# Patient Record
Sex: FEMALE | Race: BLACK OR AFRICAN AMERICAN | URBAN - METROPOLITAN AREA
[De-identification: names, ages, dates, MRNs, and addresses within clinical notes are randomized per-mention and may not be internally consistent; named-entity substitution may affect disease eponyms.]

---

## 2022-09-13 ENCOUNTER — APPOINTMENT (OUTPATIENT)
Dept: CT IMAGING | Age: 22
End: 2022-09-13
Payer: COMMERCIAL

## 2022-09-13 ENCOUNTER — HOSPITAL ENCOUNTER (EMERGENCY)
Age: 22
Discharge: HOME | End: 2022-09-14
Payer: COMMERCIAL

## 2022-09-13 VITALS
SYSTOLIC BLOOD PRESSURE: 150 MMHG | OXYGEN SATURATION: 98 % | TEMPERATURE: 98.3 F | RESPIRATION RATE: 16 BRPM | HEART RATE: 83 BPM | WEIGHT: 245.2 LBS | DIASTOLIC BLOOD PRESSURE: 72 MMHG

## 2022-09-13 DIAGNOSIS — K42.9 UMBILICAL HERNIA WITHOUT OBSTRUCTION AND WITHOUT GANGRENE: ICD-10-CM

## 2022-09-13 DIAGNOSIS — R10.9 ACUTE LEFT FLANK PAIN: ICD-10-CM

## 2022-09-13 DIAGNOSIS — N30.00 ACUTE CYSTITIS WITHOUT HEMATURIA: Primary | ICD-10-CM

## 2022-09-13 LAB
A/G RATIO: 1.4 (ref 1.1–2.2)
ALBUMIN SERPL-MCNC: 4.3 G/DL (ref 3.4–5)
ALP BLD-CCNC: 63 U/L (ref 40–129)
ALT SERPL-CCNC: 12 U/L (ref 10–40)
ANION GAP SERPL CALCULATED.3IONS-SCNC: 9 MMOL/L (ref 3–16)
ANISOCYTOSIS: ABNORMAL
AST SERPL-CCNC: 16 U/L (ref 15–37)
BACTERIA: ABNORMAL /HPF
BASOPHILS ABSOLUTE: 0.1 K/UL (ref 0–0.2)
BASOPHILS RELATIVE PERCENT: 1.4 %
BILIRUB SERPL-MCNC: <0.2 MG/DL (ref 0–1)
BILIRUBIN URINE: NEGATIVE
BLOOD, URINE: NEGATIVE
BUN BLDV-MCNC: 14 MG/DL (ref 7–20)
CALCIUM SERPL-MCNC: 9.5 MG/DL (ref 8.3–10.6)
CHLORIDE BLD-SCNC: 108 MMOL/L (ref 99–110)
CLARITY: ABNORMAL
CO2: 23 MMOL/L (ref 21–32)
COLOR: ABNORMAL
CREAT SERPL-MCNC: 0.7 MG/DL (ref 0.6–1.1)
EOSINOPHILS ABSOLUTE: 0.2 K/UL (ref 0–0.6)
EOSINOPHILS RELATIVE PERCENT: 2.6 %
EPITHELIAL CELLS, UA: 18 /HPF (ref 0–5)
GFR AFRICAN AMERICAN: >60
GFR NON-AFRICAN AMERICAN: >60
GLUCOSE BLD-MCNC: 98 MG/DL (ref 70–99)
GLUCOSE URINE: NEGATIVE MG/DL
HCG QUALITATIVE: NEGATIVE
HCT VFR BLD CALC: 38 % (ref 36–48)
HEMOGLOBIN: 12.4 G/DL (ref 12–16)
HYALINE CASTS: 2 /LPF (ref 0–8)
HYPOCHROMIA: ABNORMAL
KETONES, URINE: ABNORMAL MG/DL
LEUKOCYTE ESTERASE, URINE: ABNORMAL
LYMPHOCYTES ABSOLUTE: 2.2 K/UL (ref 1–5.1)
LYMPHOCYTES RELATIVE PERCENT: 32.5 %
MCH RBC QN AUTO: 26.9 PG (ref 26–34)
MCHC RBC AUTO-ENTMCNC: 32.7 G/DL (ref 31–36)
MCV RBC AUTO: 82.2 FL (ref 80–100)
MICROSCOPIC EXAMINATION: YES
MONOCYTES ABSOLUTE: 0.5 K/UL (ref 0–1.3)
MONOCYTES RELATIVE PERCENT: 7.9 %
NEUTROPHILS ABSOLUTE: 3.8 K/UL (ref 1.7–7.7)
NEUTROPHILS RELATIVE PERCENT: 55.6 %
NITRITE, URINE: NEGATIVE
PDW BLD-RTO: 14.7 % (ref 12.4–15.4)
PH UA: 5.5 (ref 5–8)
PLATELET # BLD: 320 K/UL (ref 135–450)
PLATELET SLIDE REVIEW: ADEQUATE
PMV BLD AUTO: 7.7 FL (ref 5–10.5)
POTASSIUM SERPL-SCNC: 4.4 MMOL/L (ref 3.5–5.1)
PROTEIN UA: 30 MG/DL
RBC # BLD: 4.62 M/UL (ref 4–5.2)
RBC UA: 1 /HPF (ref 0–4)
SLIDE REVIEW: ABNORMAL
SODIUM BLD-SCNC: 140 MMOL/L (ref 136–145)
SPECIFIC GRAVITY UA: >=1.03 (ref 1–1.03)
TOTAL PROTEIN: 7.4 G/DL (ref 6.4–8.2)
URINE REFLEX TO CULTURE: YES
URINE TYPE: ABNORMAL
UROBILINOGEN, URINE: 1 E.U./DL
WBC # BLD: 6.8 K/UL (ref 4–11)
WBC UA: 93 /HPF (ref 0–5)

## 2022-09-13 PROCEDURE — 84703 CHORIONIC GONADOTROPIN ASSAY: CPT

## 2022-09-13 PROCEDURE — 96372 THER/PROPH/DIAG INJ SC/IM: CPT

## 2022-09-13 PROCEDURE — 81001 URINALYSIS AUTO W/SCOPE: CPT

## 2022-09-13 PROCEDURE — 87086 URINE CULTURE/COLONY COUNT: CPT

## 2022-09-13 PROCEDURE — 74176 CT ABD & PELVIS W/O CONTRAST: CPT

## 2022-09-13 PROCEDURE — 85025 COMPLETE CBC W/AUTO DIFF WBC: CPT

## 2022-09-13 PROCEDURE — 80053 COMPREHEN METABOLIC PANEL: CPT

## 2022-09-13 PROCEDURE — 99284 EMERGENCY DEPT VISIT MOD MDM: CPT

## 2022-09-13 RX ORDER — CEPHALEXIN 500 MG/1
500 CAPSULE ORAL 3 TIMES DAILY
Qty: 30 CAPSULE | Refills: 0 | Status: SHIPPED | OUTPATIENT
Start: 2022-09-13 | End: 2022-09-23

## 2022-09-13 RX ORDER — ESCITALOPRAM OXALATE 5 MG/1
5 TABLET ORAL DAILY
COMMUNITY

## 2022-09-13 RX ORDER — KETOROLAC TROMETHAMINE 30 MG/ML
30 INJECTION, SOLUTION INTRAMUSCULAR; INTRAVENOUS ONCE
Status: COMPLETED | OUTPATIENT
Start: 2022-09-14 | End: 2022-09-14

## 2022-09-13 RX ORDER — ARIPIPRAZOLE 20 MG/1
20 TABLET ORAL DAILY
COMMUNITY

## 2022-09-13 RX ORDER — CYCLOBENZAPRINE HCL 10 MG
10 TABLET ORAL 3 TIMES DAILY PRN
Qty: 20 TABLET | Refills: 0 | Status: SHIPPED | OUTPATIENT
Start: 2022-09-13 | End: 2022-09-26

## 2022-09-13 RX ORDER — NAPROXEN 500 MG/1
500 TABLET ORAL 2 TIMES DAILY
Qty: 20 TABLET | Refills: 0 | Status: SHIPPED | OUTPATIENT
Start: 2022-09-13 | End: 2022-09-23

## 2022-09-13 ASSESSMENT — ENCOUNTER SYMPTOMS
SHORTNESS OF BREATH: 0
ABDOMINAL PAIN: 0
COUGH: 0
COLOR CHANGE: 0
VOMITING: 0
WHEEZING: 0
BACK PAIN: 1
CONSTIPATION: 0
STRIDOR: 0
DIARRHEA: 0
NAUSEA: 0

## 2022-09-13 ASSESSMENT — PAIN SCALES - GENERAL: PAINLEVEL_OUTOF10: 8

## 2022-09-13 ASSESSMENT — PAIN - FUNCTIONAL ASSESSMENT: PAIN_FUNCTIONAL_ASSESSMENT: 0-10

## 2022-09-13 NOTE — LETTER
[facility] Emergency Department  [facility], [address]             September 15, 2022    Patient: Clifford Shetty   YOB: 2000   Date of Visit: 9/13/2022       To Whom It May Concern:    Clifford Shetty was seen and treated in our emergency department on 9/13/2022. She is excused from work 9/14, 9/15 and 9/16/22.         Sincerely,         Aarti De La Cruz PAC/sp

## 2022-09-14 LAB — URINE CULTURE, ROUTINE: NORMAL

## 2022-09-14 PROCEDURE — 6360000002 HC RX W HCPCS: Performed by: PHYSICIAN ASSISTANT

## 2022-09-14 RX ADMIN — KETOROLAC TROMETHAMINE 30 MG: 30 INJECTION, SOLUTION INTRAMUSCULAR at 00:04

## 2022-09-14 ASSESSMENT — PAIN SCALES - GENERAL: PAINLEVEL_OUTOF10: 10

## 2022-09-14 NOTE — ED PROVIDER NOTES
[facility]        Pt Name: Alba Zapata  MRN: [id number]  [place] 2000  Date of evaluation: 9/13/2022  Provider: Juan Serrato PA-C  PCP: Michelle Adler MD  Note Started: 9:51 PM EDT       AUBREY. I have evaluated this patient. My supervising physician was available for consultation. CHIEF COMPLAINT       Chief Complaint   Patient presents with    Back Pain     Pt states that she has been having pain for 2 weeks, pt states that she had left sided back pain without any injuries. Pt states that it hurts to move. HISTORY OF PRESENT ILLNESS   (Location, Timing/Onset, Context/Setting, Quality, Duration, Modifying Factors, Severity, Associated Signs and Symptoms)  Note limiting factors. Chief Complaint: Left low back pain    Alba Zapata is a 25 y.o. female who presents to the emergency department complaining of left low back pain for 2 weeks. It is worse with movement and palpation. It is starting to migrate out to the left flank and left low abdomen. Denies nausea, vomiting, diarrhea, constipation, rash, preceding injury or fall. She rates her pain to be an 8 out of 10 on pain scale without radiation to lower extremities, numbness, tingling, weakness, bowel or bladder incontinence or retention. Denies any acute urinary symptoms, abnormal vaginal bleeding or discharge. LMP was 8/22/2022. She has no suspicion for pregnancy or STD. Nursing Notes were all reviewed and agreed with or any disagreements were addressed in the HPI. REVIEW OF SYSTEMS    (2-9 systems for level 4, 10 or more for level 5)     Review of Systems   Constitutional:  Negative for chills and fever. HENT: Negative. Eyes:  Negative for visual disturbance. Respiratory:  Negative for cough, shortness of breath, wheezing and stridor. Cardiovascular:  Negative for chest pain, palpitations and leg swelling.    Gastrointestinal: Negative for abdominal pain, constipation, diarrhea, nausea and vomiting. Genitourinary: Negative. Musculoskeletal:  Positive for back pain. Negative for neck pain and neck stiffness. Skin:  Negative for color change, pallor, rash and wound. Neurological: Negative. Psychiatric/Behavioral:  Negative for confusion. All other systems reviewed and are negative. Positives and Pertinent negatives as per HPI. Except as noted above in the ROS, all other systems were reviewed and negative. PAST MEDICAL HISTORY   History reviewed. No pertinent past medical history. SURGICAL HISTORY   History reviewed. No pertinent surgical history. CURRENTMEDICATIONS       Previous Medications    ALBUTEROL SULFATE HFA (PROVENTIL HFA) 108 (90 BASE) MCG/ACT INHALER    Inhale 2 puffs into the lungs every 4 hours as needed for Wheezing or Shortness of Breath (Space out to every 6 hours as symptoms improve) Space out to every 6 hours as symptoms improve. ARIPIPRAZOLE (ABILIFY) 20 MG TABLET    Take 20 mg by mouth daily    ESCITALOPRAM (LEXAPRO) 5 MG TABLET    Take 5 mg by mouth daily         ALLERGIES     Risperidone and related    FAMILYHISTORY     History reviewed. No pertinent family history. SOCIAL HISTORY       Social History     Tobacco Use    Smoking status: Every Day     Types: Cigarettes, E-Cigarettes    Smokeless tobacco: Never   Substance Use Topics    Alcohol use: Yes     Comment: occ    Drug use: Never       SCREENINGS    [place]on Coma Scale  Eye Opening: Spontaneous  Best Verbal Response: Oriented  Best Motor Response: Obeys commands  Aziza Coma Scale Score: 15        PHYSICAL EXAM    (up to 7 for level 4, 8 or more for level 5)     ED Triage Vitals   BP Temp Temp src Pulse Resp SpO2 Height Weight   -- -- -- -- -- -- -- --       Physical Exam  Vitals and nursing note reviewed. Constitutional:       Appearance: Normal appearance. She is well-developed. She is obese.  She is not toxic-appearing or diaphoretic. HENT:      Head: Normocephalic and atraumatic. Right Ear: External ear normal.      Left Ear: External ear normal.      Nose: Nose normal.      Mouth/Throat:      Mouth: Mucous membranes are moist.      Pharynx: Oropharynx is clear. Eyes:      General: No scleral icterus. Right eye: No discharge. Left eye: No discharge. Extraocular Movements: Extraocular movements intact. Conjunctiva/sclera: Conjunctivae normal.      Pupils: Pupils are equal, round, and reactive to light. Cardiovascular:      Rate and Rhythm: Normal rate. Pulmonary:      Effort: Pulmonary effort is normal.      Breath sounds: Normal breath sounds. Abdominal:      General: Bowel sounds are normal. There is no distension or abdominal bruit. Palpations: Abdomen is soft. There is no pulsatile mass. Tenderness: abdominal tenderness in the left lower quadrant There is left CVA tenderness. There is no right CVA tenderness, guarding or rebound. Negative signs include Koroma's sign, Rovsing's sign, McBurney's sign, psoas sign and obturator sign. Musculoskeletal:      Cervical back: Normal and normal range of motion. Thoracic back: Normal.      Lumbar back: Spasms and tenderness (left sided musculature) present. No swelling, edema, deformity, signs of trauma, lacerations or bony tenderness. Decreased range of motion. Negative right straight leg raise test and negative left straight leg raise test. No scoliosis. Comments: No midline vertebral tenderness or step-off deformity. Negative straight leg raise. No saddle paresthesia or foot drop. Able to heel and toe walk without difficulty or deficit. 5/5 strength in all four extremities without focal weakness, paresthesia or radiculopathy     Skin:     General: Skin is warm and dry. Coloration: Skin is not jaundiced or pale. Findings: No bruising, erythema, lesion or rash.    Neurological:      General: No focal deficit present. Mental Status: She is alert and oriented to person, place, and time. Sensory: No sensory deficit. Motor: No weakness. Deep Tendon Reflexes: Reflexes normal.   Psychiatric:         Behavior: Behavior normal.       DIAGNOSTIC RESULTS   LABS:    Labs Reviewed   CBC WITH AUTO DIFFERENTIAL - Abnormal; Notable for the following components:       Result Value    Anisocytosis Occasional (*)     Hypochromia Occasional (*)     All other components within normal limits   URINALYSIS WITH REFLEX TO CULTURE - Abnormal; Notable for the following components:    Color, UA DARK YELLOW (*)     Clarity, UA CLOUDY (*)     Ketones, Urine TRACE (*)     Protein, UA 30 (*)     Leukocyte Esterase, Urine MODERATE (*)     All other components within normal limits   MICROSCOPIC URINALYSIS - Abnormal; Notable for the following components:    Bacteria, UA 4+ (*)     WBC, UA 93 (*)     Epithelial Cells, UA 18 (*)     All other components within normal limits   CULTURE, URINE   COMPREHENSIVE METABOLIC PANEL   HCG, SERUM, QUALITATIVE       When ordered only abnormal lab results are displayed. All other labs were within normal range or not returned as of this dictation. EKG: When ordered, EKG's are interpreted by the Emergency Department Physician in the absence of a cardiologist.  Please see their note for interpretation of EKG. RADIOLOGY:   Non-plain film images such as CT, Ultrasound and MRI are read by the radiologist. Plain radiographic images are visualized and preliminarily interpreted by the ED Provider with the below findings:        Interpretation per the Radiologist below, if available at the time of this note:    CT ABDOMEN PELVIS WO CONTRAST Additional Contrast? None   Final Result   No acute intra-abdominal abnormality seen. No hydronephrosis or renal stones and no CT evidence of urinary obstruction. Mild chronic liver changes with fatty replacement throughout.       Small 2 cm umbilical hernia with no bowel loops in the hernia. No pelvic mass or active inflammation and a normal appendix. Questionable unilateral pars defect at L5 on the right with no subluxation. PROCEDURES   Unless otherwise noted below, none     Procedures    CRITICAL CARE TIME   N/a    CONSULTS:  None      EMERGENCY DEPARTMENT COURSE and DIFFERENTIAL DIAGNOSIS/MDM:   Vitals:    Vitals:    09/13/22 2152 09/13/22 2154   BP: (!) 150/72    Pulse: 83    Resp: 16    Temp:  98.3 °F (36.8 °C)   TempSrc: Oral    SpO2: 98%    Weight:  245 lb 3.2 oz (111.2 kg)       Patient was given the following medications:  Medications   ketorolac (TORADOL) injection 30 mg (has no administration in time range)         Is this patient to be included in the SEP-1 Core Measure due to severe sepsis or septic shock? No   Exclusion criteria - the patient is NOT to be included for SEP-1 Core Measure due to:  2+ SIRS criteria are not met    This patient presents complaining of left low back/flank pain. Urinalysis does suggest infection. Will be sent home with a 10-day course of antibiotic for possible developing pyelonephritis. Other differentials include but not limited to strain, sprain, arthritis, bursitis, tendinitis, contusion, spasm, DDD, DJD. CT scan appears stable but does have some incidental findings which were discussed with the patient. Blood work is unremarkable. Renal function preserved.   My suspicion is low for acute spine fracture or dislocation, epidural abscess or hematoma, discitis, meningitis, encephalitis, transverse myelitis, cauda quina, shingles, acute surgical abdomen, obstruction, perforation, abscess, mesenteric ischemia, AAA, dissection, cholecystitis, cholangitis, pancreatitis, appendicitis, C. diff colitis, diverticulitis, volvulus, incarcerated hernia, necrotizing fasciitis, TOA, ovarian torsion, PID, ectopic pregnancy, leeann brandt Scott syndrome, help syndrome, preeclampsia, incarcerated hernia, Valente gangrene,perinephric abscess, kidney stone, urosepsis, fistula, intussusception, pyloric stenosis, or other concerning pathology. FINAL IMPRESSION      1. Acute cystitis without hematuria    2. Acute left flank pain    3.  Umbilical hernia without obstruction and without gangrene          DISPOSITION/PLAN   DISPOSITION Decision To Discharge 09/13/2022 11:53:36 PM      PATIENT REFERRED TO:  Tobias Best MD    In 3 days      [facility] Emergency Department  [address] [place] [address]  [phone]    If symptoms worsen    DISCHARGE MEDICATIONS:  New Prescriptions    CEPHALEXIN (KEFLEX) 500 MG CAPSULE    Take 1 capsule by mouth 3 times daily for 10 days    CYCLOBENZAPRINE (FLEXERIL) 10 MG TABLET    Take 1 tablet by mouth 3 times daily as needed for Muscle spasms    NAPROXEN (NAPROSYN) 500 MG TABLET    Take 1 tablet by mouth 2 times daily for 20 doses       DISCONTINUED MEDICATIONS:  Discontinued Medications    No medications on file              (Please note that portions of this note were completed with a voice recognition program.  Efforts were made to edit the dictations but occasionally words are mis-transcribed.)    Cassidy Tovar PA-C (electronically signed)           Cassidy Tovar PA-C  09/13/22 2841

## 2022-09-14 NOTE — ED NOTES
Discharge and education instructions reviewed. Patient verbalized understanding, teach-back successful. Patient denied questions at this time. No acute distress noted. Patient instructed to follow-up as noted - return to emergency department if symptoms worsen. Patient verbalized understanding. Discharged per EDMD with discharged instructions.        Reinaldo Limon RN  09/14/22 0009

## 2022-09-26 ENCOUNTER — HOSPITAL ENCOUNTER (EMERGENCY)
Age: 22
Discharge: HOME | End: 2022-09-26
Attending: EMERGENCY MEDICINE
Payer: COMMERCIAL

## 2022-09-26 VITALS
TEMPERATURE: 98.6 F | SYSTOLIC BLOOD PRESSURE: 120 MMHG | RESPIRATION RATE: 18 BRPM | HEIGHT: 65 IN | WEIGHT: 230 LBS | BODY MASS INDEX: 38.32 KG/M2 | OXYGEN SATURATION: 98 % | DIASTOLIC BLOOD PRESSURE: 74 MMHG | HEART RATE: 78 BPM

## 2022-09-26 DIAGNOSIS — N12 PYELONEPHRITIS: Primary | ICD-10-CM

## 2022-09-26 LAB
A/G RATIO: 1.4 (ref 1.1–2.2)
ALBUMIN SERPL-MCNC: 4.6 G/DL (ref 3.4–5)
ALP BLD-CCNC: 78 U/L (ref 40–129)
ALT SERPL-CCNC: 12 U/L (ref 10–40)
ANION GAP SERPL CALCULATED.3IONS-SCNC: 13 MMOL/L (ref 3–16)
AST SERPL-CCNC: 22 U/L (ref 15–37)
BACTERIA: ABNORMAL /HPF
BASOPHILS ABSOLUTE: 0.1 K/UL (ref 0–0.2)
BASOPHILS RELATIVE PERCENT: 1.1 %
BILIRUB SERPL-MCNC: 0.3 MG/DL (ref 0–1)
BILIRUBIN URINE: NEGATIVE
BLOOD, URINE: NEGATIVE
BUN BLDV-MCNC: 16 MG/DL (ref 7–20)
CALCIUM SERPL-MCNC: 9.2 MG/DL (ref 8.3–10.6)
CHLORIDE BLD-SCNC: 105 MMOL/L (ref 99–110)
CLARITY: ABNORMAL
CO2: 23 MMOL/L (ref 21–32)
COLOR: YELLOW
CREAT SERPL-MCNC: 0.6 MG/DL (ref 0.6–1.1)
EOSINOPHILS ABSOLUTE: 0.1 K/UL (ref 0–0.6)
EOSINOPHILS RELATIVE PERCENT: 2.4 %
EPITHELIAL CELLS, UA: 23 /HPF (ref 0–5)
GFR AFRICAN AMERICAN: >60
GFR NON-AFRICAN AMERICAN: >60
GLUCOSE BLD-MCNC: 131 MG/DL (ref 70–99)
GLUCOSE URINE: NEGATIVE MG/DL
HCG QUALITATIVE: NEGATIVE
HCT VFR BLD CALC: 38.7 % (ref 36–48)
HEMOGLOBIN: 13.1 G/DL (ref 12–16)
HYALINE CASTS: 0 /LPF (ref 0–8)
KETONES, URINE: 80 MG/DL
LEUKOCYTE ESTERASE, URINE: ABNORMAL
LYMPHOCYTES ABSOLUTE: 1.9 K/UL (ref 1–5.1)
LYMPHOCYTES RELATIVE PERCENT: 33.2 %
MCH RBC QN AUTO: 27.4 PG (ref 26–34)
MCHC RBC AUTO-ENTMCNC: 33.8 G/DL (ref 31–36)
MCV RBC AUTO: 81.1 FL (ref 80–100)
MICROSCOPIC EXAMINATION: YES
MONOCYTES ABSOLUTE: 0.5 K/UL (ref 0–1.3)
MONOCYTES RELATIVE PERCENT: 7.9 %
NEUTROPHILS ABSOLUTE: 3.2 K/UL (ref 1.7–7.7)
NEUTROPHILS RELATIVE PERCENT: 55.4 %
NITRITE, URINE: NEGATIVE
PDW BLD-RTO: 14.4 % (ref 12.4–15.4)
PH UA: 6 (ref 5–8)
PLATELET # BLD: 382 K/UL (ref 135–450)
PMV BLD AUTO: 7.6 FL (ref 5–10.5)
POTASSIUM REFLEX MAGNESIUM: 4.2 MMOL/L (ref 3.5–5.1)
PROTEIN UA: ABNORMAL MG/DL
RBC # BLD: 4.77 M/UL (ref 4–5.2)
RBC UA: 1 /HPF (ref 0–4)
SODIUM BLD-SCNC: 141 MMOL/L (ref 136–145)
SPECIFIC GRAVITY UA: >=1.03 (ref 1–1.03)
TOTAL PROTEIN: 8 G/DL (ref 6.4–8.2)
URINE REFLEX TO CULTURE: YES
URINE TYPE: ABNORMAL
UROBILINOGEN, URINE: 1 E.U./DL
WBC # BLD: 5.8 K/UL (ref 4–11)
WBC UA: 15 /HPF (ref 0–5)

## 2022-09-26 PROCEDURE — 80053 COMPREHEN METABOLIC PANEL: CPT

## 2022-09-26 PROCEDURE — 99284 EMERGENCY DEPT VISIT MOD MDM: CPT

## 2022-09-26 PROCEDURE — 81001 URINALYSIS AUTO W/SCOPE: CPT

## 2022-09-26 PROCEDURE — 6360000002 HC RX W HCPCS: Performed by: EMERGENCY MEDICINE

## 2022-09-26 PROCEDURE — 85025 COMPLETE CBC W/AUTO DIFF WBC: CPT

## 2022-09-26 PROCEDURE — 84703 CHORIONIC GONADOTROPIN ASSAY: CPT

## 2022-09-26 PROCEDURE — 96374 THER/PROPH/DIAG INJ IV PUSH: CPT

## 2022-09-26 PROCEDURE — 87086 URINE CULTURE/COLONY COUNT: CPT

## 2022-09-26 PROCEDURE — 96375 TX/PRO/DX INJ NEW DRUG ADDON: CPT

## 2022-09-26 PROCEDURE — 36415 COLL VENOUS BLD VENIPUNCTURE: CPT

## 2022-09-26 RX ORDER — KETOROLAC TROMETHAMINE 30 MG/ML
15 INJECTION, SOLUTION INTRAMUSCULAR; INTRAVENOUS ONCE
Status: COMPLETED | OUTPATIENT
Start: 2022-09-26 | End: 2022-09-26

## 2022-09-26 RX ORDER — CEFDINIR 300 MG/1
300 CAPSULE ORAL 2 TIMES DAILY
Qty: 28 CAPSULE | Refills: 0 | Status: SHIPPED | OUTPATIENT
Start: 2022-09-26 | End: 2022-10-10

## 2022-09-26 RX ORDER — MORPHINE SULFATE 15 MG/1
7.5 TABLET ORAL EVERY 6 HOURS PRN
Qty: 8 TABLET | Refills: 0 | Status: SHIPPED | OUTPATIENT
Start: 2022-09-26 | End: 2022-09-29

## 2022-09-26 RX ORDER — MORPHINE SULFATE 4 MG/ML
4 INJECTION, SOLUTION INTRAMUSCULAR; INTRAVENOUS ONCE
Status: COMPLETED | OUTPATIENT
Start: 2022-09-26 | End: 2022-09-26

## 2022-09-26 RX ADMIN — KETOROLAC TROMETHAMINE 15 MG: 30 INJECTION, SOLUTION INTRAMUSCULAR at 04:24

## 2022-09-26 RX ADMIN — MORPHINE SULFATE 4 MG: 4 INJECTION, SOLUTION INTRAMUSCULAR; INTRAVENOUS at 05:12

## 2022-09-26 RX ADMIN — Medication 1000 MG: at 04:35

## 2022-09-26 ASSESSMENT — PAIN - FUNCTIONAL ASSESSMENT
PAIN_FUNCTIONAL_ASSESSMENT: 0-10
PAIN_FUNCTIONAL_ASSESSMENT: 0-10

## 2022-09-26 ASSESSMENT — PAIN DESCRIPTION - LOCATION: LOCATION: FLANK

## 2022-09-26 ASSESSMENT — PAIN SCALES - GENERAL
PAINLEVEL_OUTOF10: 10

## 2022-09-26 NOTE — ED PROVIDER NOTES
[address] [facility] PROVIDER NOTE    Patient Identification  Pt Name: Abrahan Arndt  MRN: [id number]  [place] 2000  Date of evaluation: 9/26/2022  Provider: Nick Hernandez MD  PCP: Galina Liriano MD    Chief Complaint  Flank Pain (Bilateral, was treated for kidney infection 1 week ago, symptoms havent gotten better.)      HPI  History provided by patient   This is a 25 y.o. female who presents to the ED for left flank/left lower back pain. Ongoing for the past 3 weeks. Not improved by Keflex. Seem to have worsened yesterday prompting her to come here. She has urinary frequency without dysuria. No fevers or chills. No chest pain or shortness of breath. No nausea or vomiting. Tolerating oral intake without issue. Denies prior medical problems. Vianey Blanton ROS  12 systems reviewed, pertinent positives/negatives per HPI otherwise noted to be negative. I have reviewed the following nursing documentation:  Allergies: Risperidone and related, Shellfish allergy, Sulfamethoxazole, and Trimethoprim    Past medical history: History reviewed. No pertinent past medical history. Past surgical history: History reviewed. No pertinent surgical history. Home medications:   Previous Medications    ALBUTEROL SULFATE HFA (PROVENTIL HFA) 108 (90 BASE) MCG/ACT INHALER    Inhale 2 puffs into the lungs every 4 hours as needed for Wheezing or Shortness of Breath (Space out to every 6 hours as symptoms improve) Space out to every 6 hours as symptoms improve. ARIPIPRAZOLE (ABILIFY) 20 MG TABLET    Take 20 mg by mouth daily    ESCITALOPRAM (LEXAPRO) 5 MG TABLET    Take 5 mg by mouth daily    NAPROXEN (NAPROSYN) 500 MG TABLET    Take 1 tablet by mouth 2 times daily for 20 doses       Social history:  reports that she has been smoking cigarettes and e-cigarettes. She has never used smokeless tobacco. She reports current alcohol use. She reports that she does not use drugs.     Family history: 20 mg/dL    Creatinine 0.6 0.6 - 1.1 mg/dL    GFR Non-African American >60 >60    GFR African American >60 >60    Calcium 9.2 8.3 - 10.6 mg/dL    Total Protein 8.0 6.4 - 8.2 g/dL    Albumin 4.6 3.4 - 5.0 g/dL    Albumin/Globulin Ratio 1.4 1.1 - 2.2    Total Bilirubin 0.3 0.0 - 1.0 mg/dL    Alkaline Phosphatase 78 40 - 129 U/L    ALT 12 10 - 40 U/L    AST 22 15 - 37 U/L   HCG Qualitative, Serum   Result Value Ref Range    hCG Qual Negative Detects HCG level >10 MIU/mL   Urinalysis with Reflex to Culture    Specimen: Urine   Result Value Ref Range    Color, UA Yellow Straw/Yellow    Clarity, UA CLOUDY (A) Clear    Glucose, Ur Negative Negative mg/dL    Bilirubin Urine Negative Negative    Ketones, Urine 80 (A) Negative mg/dL    Specific Gravity, UA >=1.030 1.005 - 1.030    Blood, Urine Negative Negative    pH, UA 6.0 5.0 - 8.0    Protein, UA TRACE (A) Negative mg/dL    Urobilinogen, Urine 1.0 <2.0 E.U./dL    Nitrite, Urine Negative Negative    Leukocyte Esterase, Urine SMALL (A) Negative    Microscopic Examination YES     Urine Type NotGiven     Urine Reflex to Culture Yes    Microscopic Urinalysis   Result Value Ref Range    Bacteria, UA 4+ (A) None Seen /HPF    Hyaline Casts, UA 0 0 - 8 /LPF    WBC, UA 15 (H) 0 - 5 /HPF    RBC, UA 1 0 - 4 /HPF    Epithelial Cells, UA 23 (H) 0 - 5 /HPF       Screenings   Aziza Coma Scale  Eye Opening: Spontaneous  Best Verbal Response: Oriented  Best Motor Response: Obeys commands  Sarah Coma Scale Score: 15       MDM and ED Course  This is a 25 y.o. female who presents to the ED for left flank/left lower back pain. Second visit for this issue. Had CT scan which at that time showed no acute abnormalities. Work-up at that time did show urinary tract infection with presumed pyelonephritis and patient was appropriately started on Keflex. Will recheck urinalysis to see if this is treatment failure due to resistant bacteria. We will recheck kidney function.   Patient has normal vitals with benign abdominal exam.  At this time given her young age, I feel that repeat CT abdomen and pelvis radiation risk outweighs benefit. She does have a family doctor who she can follow-up with. She is tolerating oral intake. Pain is under control. Pain is too far lateral for me to think that this is due to ovarian torsion. Doubt diverticulitis. No urinary calculus was seen at last visit and I doubt 1 would show up this time. May be musculoskeletal.  May be IBS.     ---------    Urinalysis does show bacteria again. I would consider this is treatment failure. Patient has no signs of sepsis. No signs of endorgan damage. She is tolerating oral intake well. She is well-appearing with no medical comorbidities and is young. She is able to come back to the hospital if necessary. She is taking care of activities of daily living. For all these reasons, and since I was able to get her pain under control, I do feel that she is safe for discharge at this time with a trial of a different antibiotic. Was given Rocephin and cefdinir. [unfilled]    Is this patient to be included in the SEP-1 Core Measure due to severe sepsis or septic shock? No   Exclusion criteria - the patient is NOT to be included for SEP-1 Core Measure due to:  2+ SIRS criteria are not met        Final Impression  1. Pyelonephritis        Blood pressure 123/86, pulse 72, temperature 98.6 °F (37 °C), temperature source Oral, resp. rate 18, height 5' 5\" (1.651 m), weight 230 lb (104.3 kg), last menstrual period 09/22/2022, SpO2 98 %. Disposition:  DISPOSITION Decision To Discharge 09/26/2022 04:53:54 AM      Patient Referrals:  Aron Seaman MD    In 1 day      Discharge Medications:  New Prescriptions    CEFDINIR (OMNICEF) 300 MG CAPSULE    Take 1 capsule by mouth 2 times daily for 14 days    MORPHINE (MSIR) 15 MG TABLET    Take 0.5 tablets by mouth every 6 hours as needed for Pain for up to 3 days.        Discontinued Medications:  Discontinued Medications    CYCLOBENZAPRINE (FLEXERIL) 10 MG TABLET    Take 1 tablet by mouth 3 times daily as needed for Muscle spasms       This chart was generated using the [address] [place] [address] [address] dictation system. I created this record but it may contain dictation errors given the limitations of this technology.         Dmitry Chanel MD  09/26/22 5613

## 2022-09-26 NOTE — DISCHARGE INSTRUCTIONS
Please return if you have any new, worsening, or concerning symptoms like inability to eat/drink/walk, fevers. Expect improvement within 48-72 hours. Contact your primary care physician tomorrow to make a follow up appointment this week. We are prescribing you a narcotic for breakthrough pain. Use this for breakthrough pain that is not addressed by other methods. You can take 1/2 tablet 7.5mg every 6 hours as needed by mouth. Do not take this with alcohol or sleeping medications in the benzodiazepine family (such as valium, xanax, ativan). Narcotic pain medications may cause addiction. Do not use this while driving, operating heavy machinery, making important life decisions, or other risky situations. This medication will cause constipation which will also need to be addressed. Try to eat a diet high in fiber or take over the counter miralax while on this. negative

## 2022-09-27 LAB — URINE CULTURE, ROUTINE: NORMAL

## 2024-11-05 ENCOUNTER — OFFICE VISIT (OUTPATIENT)
Age: 24
End: 2024-11-05

## 2024-11-05 VITALS
RESPIRATION RATE: 18 BRPM | TEMPERATURE: 98.2 F | OXYGEN SATURATION: 98 % | DIASTOLIC BLOOD PRESSURE: 84 MMHG | SYSTOLIC BLOOD PRESSURE: 126 MMHG | HEART RATE: 98 BPM | HEIGHT: 65 IN | BODY MASS INDEX: 36.35 KG/M2 | WEIGHT: 218.2 LBS

## 2024-11-05 DIAGNOSIS — R49.0 HOARSENESS: ICD-10-CM

## 2024-11-05 DIAGNOSIS — J06.9 VIRAL URI WITH COUGH: Primary | ICD-10-CM

## 2024-11-05 DIAGNOSIS — J04.0 LARYNGITIS: ICD-10-CM

## 2024-11-05 DIAGNOSIS — Z75.8 DOES NOT HAVE PRIMARY CARE PROVIDER: ICD-10-CM

## 2024-11-05 DIAGNOSIS — J02.9 SORE THROAT: ICD-10-CM

## 2024-11-05 LAB — S PYO AG THROAT QL: NORMAL

## 2024-11-05 RX ORDER — DEXTROMETHORPHAN HYDROBROMIDE AND PROMETHAZINE HYDROCHLORIDE 15; 6.25 MG/5ML; MG/5ML
5 SYRUP ORAL 4 TIMES DAILY PRN
Qty: 118 ML | Refills: 0 | Status: SHIPPED | OUTPATIENT
Start: 2024-11-05 | End: 2024-11-12

## 2024-11-05 RX ORDER — METHYLPREDNISOLONE 4 MG/1
TABLET ORAL
Qty: 1 KIT | Refills: 0 | Status: SHIPPED | OUTPATIENT
Start: 2024-11-05 | End: 2024-11-11

## 2024-11-05 ASSESSMENT — ENCOUNTER SYMPTOMS
BACK PAIN: 0
VOMITING: 0
ABDOMINAL PAIN: 0
COUGH: 1
RHINORRHEA: 0
DIARRHEA: 0
SORE THROAT: 1
VOICE CHANGE: 1
NAUSEA: 0
SHORTNESS OF BREATH: 0

## 2024-11-05 NOTE — PATIENT INSTRUCTIONS
New Prescriptions    METHYLPREDNISOLONE (MEDROL DOSEPACK) 4 MG TABLET    Take by mouth per package directions.    PROMETHAZINE-DEXTROMETHORPHAN (PROMETHAZINE-DM) 6.25-15 MG/5ML SYRUP    Take 5 mLs by mouth 4 times daily as needed for Cough      Try a cool mist humidifer at home.  Take the steroid pack as directed.  Take the cough medicine as needed for symptom relief.  Take tylenol and/or ibuprofen for pain/fever relief.  Encourage rest and increase fluid intake.  Follow-up with the PCP referral given today as soon as possible.  Return for severe/worsening symptoms.

## 2024-11-05 NOTE — PROGRESS NOTES
Aj Zuniga (:  2000) is a 23 y.o. female,Established patient, here for evaluation of the following chief complaint(s):  Pharyngitis (Sore throat with hoarseness, and night sweats x 5 days ) and Cough (Dry cough x 5 days )      Assessment & Plan :  Visit Diagnoses and Associated Orders       Viral URI with cough    -  Primary    methylPREDNISolone (MEDROL DOSEPACK) 4 MG tablet [4991]      promethazine-dextromethorphan (PROMETHAZINE-DM) 6.25-15 MG/5ML syrup [50169]           Sore throat        POCT rapid strep A [64617 Custom]           Hoarseness        POCT rapid strep A [36002 Custom]           Laryngitis             Does not have primary care provider        [facility] [[id number] Custom]                 Results for orders placed or performed in visit on 24   POCT rapid strep A   Result Value Ref Range    Strep A Ag None Detected None Detected       Differential diagnoses: strep pharyngitis, viral pharyngitis, viral URI, allergic rhinitis, covid, influenza, otitis media, tonsillar abscess     Plan: Her strep test was negative today in the office. She most likely has a viral URI/laryngitis. She was prescribed a steroid pack and cough medicine for symptom relief. She was encouraged to rest and increase fluid intake and continue to take tylenol and/or ibuprofen for pain/fever relief. Try a cool mist humidifier at home for symptom relief. Follow-up with PCP referral given today as soon as possible. Return precautions discussed. Follow up in 3 days if symptoms persist or if symptoms worsen.       Subjective :  HPI  Aj Zuniga is a 23 y.o. female who presents with complaints of throat irritation and a dry cough. She states that she has noted symptoms for the last 5 days. She states that her throat feels irritated and dry. She denies pain when swallowing. She states that she lost her voice a couple days ago. She has also noted

## 2024-11-07 ENCOUNTER — OFFICE VISIT (OUTPATIENT)
Age: 24
End: 2024-11-07

## 2024-11-07 VITALS
TEMPERATURE: 99.2 F | WEIGHT: 218 LBS | HEART RATE: 98 BPM | HEIGHT: 65 IN | OXYGEN SATURATION: 98 % | BODY MASS INDEX: 36.32 KG/M2 | DIASTOLIC BLOOD PRESSURE: 89 MMHG | SYSTOLIC BLOOD PRESSURE: 137 MMHG

## 2024-11-07 DIAGNOSIS — Z20.2 STD EXPOSURE: Primary | ICD-10-CM

## 2024-11-07 LAB
BILIRUBIN, POC: NEGATIVE
BLOOD URINE, POC: NEGATIVE
CLARITY, POC: CLEAR
COLOR, POC: YELLOW
GLUCOSE URINE, POC: NEGATIVE MG/DL
KETONES, POC: NEGATIVE MG/DL
LEUKOCYTE EST, POC: NEGATIVE
NITRITE, POC: NEGATIVE
PH, POC: 7
PROTEIN, POC: NORMAL MG/DL
SPECIFIC GRAVITY, POC: 1.02
UROBILINOGEN, POC: NORMAL MG/DL

## 2024-11-07 NOTE — PROGRESS NOTES
Aj Zuniga (: 2000) is a 23 y.o. female, Established patient, here for evaluation of the following chief complaint(s):  Exposure to STD (Slight itching; request testing)      ASSESSMENT/PLAN:    ICD-10-CM    1. STD exposure  Z20.2 POCT Urinalysis no Micro     C.trachomatis N.gonorrhoeae DNA, Urine ([place] Only)     Vaginitis Panel PCR            Discussed PCP follow up for persisting or worsening symptoms, or to return to the clinic if unable to obtain PCP follow up for worsening symptoms.    The patient tolerated their visit well. The patient and/or the family were informed of the results of any tests, a time was given to answer questions, a plan was proposed and they agreed with plan. Reviewed AVS with treatment instructions and answered questions - pt/family expresses understanding and agreement with the discussed treatment plan and AVS instructions.      SUBJECTIVE/OBJECTIVE:  Reason for Visit  Exposure to STD Slight itching; request testing          Exposure to STD         VITAL SIGNS  Vitals:    24 1323   BP: 137/89   Pulse: 98   Temp: 99.2 °F (37.3 °C)   TempSrc: Temporal   SpO2: 98%   Weight: 98.9 kg (218 lb)   Height: 1.651 m (5' 5\")       Review of Systems  See HPI for pertinent positives and negatives.    Physical Exam  Vitals reviewed.       PROCEDURES:  Unless otherwise noted below, none     Procedures    RESULTS:  Results for orders placed or performed in visit on 24   Vaginitis Panel PCR    Specimen: Vaginal   Result Value Ref Range    Comment See Below    POCT Urinalysis no Micro   Result Value Ref Range    Color, UA yellow     Clarity, UA clear     Glucose, UA POC negative mg/dL    Bilirubin, UA negative     Ketones, UA negative mg/dL    Spec Grav, UA 1.020     Blood, UA POC negative     pH, UA 7.0     Protein, UA POC trace mg/dL    Urobilinogen, UA 0.2 E.U mg/dL    Leukocytes, UA negative     Nitrite, UA negative      An electronic signature was

## 2024-11-07 NOTE — PATIENT INSTRUCTIONS
Aj Aguilar,    Thank you for trusting [facility] with your health care needs. Your decision to come to us means a lot, and we are honored to be part of your healthcare journey.  At [facility], our dedicated team is committed to providing you with the highest quality of care in a warm and welcoming environment. Your health and well-being are our top priorities, and we appreciate the opportunity to serve you.    Thank you for choosing us, and we’re here for you whenever you need us!    Warm regards,       [facility]    [] Dr. Enriquez  [] Vaishali Montoya, NP-C    [] AMPARO Giles, Supervisor      [] AMPARO Hale  [] AMPARO Jones   [] AMPARO Lane    [] AMPARO Sexton

## 2024-11-08 LAB
BV BACTERIA DNA VAG QL NAA+PROBE: NOT DETECTED
C GLABRATA DNA VAG QL NAA+PROBE: NORMAL
C GLABRATA DNA VAG QL NAA+PROBE: NOT DETECTED
C KRUSEI DNA VAG QL NAA+PROBE: NOT DETECTED
C TRACH DNA UR QL NAA+PROBE: NEGATIVE
CANDIDA DNA VAG QL NAA+PROBE: NOT DETECTED
N GONORRHOEA DNA UR QL NAA+PROBE: NEGATIVE
T VAGINALIS DNA VAG QL NAA+PROBE: NOT DETECTED

## 2025-04-14 ENCOUNTER — OFFICE VISIT (OUTPATIENT)
Dept: PRIMARY CARE CLINIC | Age: 25
End: 2025-04-14
Payer: COMMERCIAL

## 2025-04-14 VITALS
HEIGHT: 65 IN | SYSTOLIC BLOOD PRESSURE: 134 MMHG | BODY MASS INDEX: 38.79 KG/M2 | HEART RATE: 93 BPM | TEMPERATURE: 97.7 F | DIASTOLIC BLOOD PRESSURE: 84 MMHG | WEIGHT: 232.8 LBS | OXYGEN SATURATION: 99 %

## 2025-04-14 DIAGNOSIS — N89.8 VAGINAL ODOR: Primary | ICD-10-CM

## 2025-04-14 DIAGNOSIS — Z13.220 SCREENING FOR LIPID DISORDERS: ICD-10-CM

## 2025-04-14 DIAGNOSIS — Z20.2 SEXUALLY TRANSMITTED DISEASE EXPOSURE: ICD-10-CM

## 2025-04-14 DIAGNOSIS — F31.81 BIPOLAR 2 DISORDER (HCC): ICD-10-CM

## 2025-04-14 PROCEDURE — G8417 CALC BMI ABV UP PARAM F/U: HCPCS | Performed by: FAMILY MEDICINE

## 2025-04-14 PROCEDURE — 36415 COLL VENOUS BLD VENIPUNCTURE: CPT | Performed by: FAMILY MEDICINE

## 2025-04-14 PROCEDURE — 4004F PT TOBACCO SCREEN RCVD TLK: CPT | Performed by: FAMILY MEDICINE

## 2025-04-14 PROCEDURE — 99214 OFFICE O/P EST MOD 30 MIN: CPT | Performed by: FAMILY MEDICINE

## 2025-04-14 PROCEDURE — G8427 DOCREV CUR MEDS BY ELIG CLIN: HCPCS | Performed by: FAMILY MEDICINE

## 2025-04-14 ASSESSMENT — ENCOUNTER SYMPTOMS
COUGH: 0
DIARRHEA: 0
CONSTIPATION: 0
VOMITING: 0
NAUSEA: 0
SHORTNESS OF BREATH: 0

## 2025-04-14 NOTE — PROGRESS NOTES
Lenard Chun (:  2000) is a 24 y.o. female,Established patient, here for evaluation of the following chief complaint(s):  Exposure to STD and Medication Refill (Eye drops )      SUBJECTIVE:  27:    Exposure to STD    Has had multiple partners recently, but noticed after one partner that she had a different vaginal odor. Had concerns for any STI, so will test  Denies hematuria, suprapubic pain, vaginal discharge, lesions to genitals or mouth. No hx of STI in the past.         3.12.25: new [facility], [facility]    27 -- saw psychiatry  --- new appt with doctor, because other doc had left  Had not been on meds prior to that  Does not like the new psychiatrist -- states she is rude  Pt states she has an eating disorder, and was told that she MUST eat  Recent increase in latuda, and added on zoloft  Worried about having bipolar and inducing mau from zoloft  --- will follow up with her in 4-6 weeks to recheck stability on these meds and follow on lab work    Bipolar/ptsd/borderline:  - on latuda 40 mg  - on Wellbutrin  - did not like zoloft, so has stopped this  - has another appt coming up with psych  - will refer to another psych so she can get further eval     Exposure to STD   Pertinent negatives include no fever or urinary frequency.   Medication Refill  Pertinent negatives include no chills, coughing, fever, nausea, rash or vomiting.           I have reviewed the chart notes available from myself and other providers. I have reviewed and addressed all active problems and created or updated the problems list in detail, as needed    I have extensively reviewed and reconciled the medication list, discontinued medications not taking or no longer appropriate, and updated the active meds list    OBJECTIVE:  Review of Systems   Constitutional:  Negative for chills and fever.   Respiratory:  Negative for cough and shortness of breath.    Cardiovascular:  Negative for leg swelling.

## 2025-04-18 LAB
ALBUMIN SERPL-MCNC: 4.6 G/DL (ref 3.4–5)
ALBUMIN/GLOB SERPL: 1.6 {RATIO} (ref 1.1–2.2)
ALP SERPL-CCNC: 69 U/L (ref 40–129)
ALT SERPL-CCNC: 18 U/L (ref 10–40)
ANION GAP SERPL CALCULATED.3IONS-SCNC: 12 MMOL/L (ref 3–16)
AST SERPL-CCNC: 16 U/L (ref 15–37)
BILIRUB DIRECT SERPL-MCNC: <0.1 MG/DL (ref 0–0.3)
BILIRUB INDIRECT SERPL-MCNC: 0.3 MG/DL (ref 0–1)
BILIRUB SERPL-MCNC: 0.4 MG/DL (ref 0–1)
BUN SERPL-MCNC: 18 MG/DL (ref 7–20)
CALCIUM SERPL-MCNC: 9.6 MG/DL (ref 8.3–10.6)
CHLORIDE SERPL-SCNC: 104 MMOL/L (ref 99–110)
CHOLEST SERPL-MCNC: 246 MG/DL (ref 0–199)
CO2 SERPL-SCNC: 24 MMOL/L (ref 21–32)
CREAT SERPL-MCNC: 1 MG/DL (ref 0.6–1.1)
EST. AVERAGE GLUCOSE BLD GHB EST-MCNC: 99.7 MG/DL
GFR SERPLBLD CREATININE-BSD FMLA CKD-EPI: 82 ML/MIN/{1.73_M2}
GLUCOSE SERPL-MCNC: 75 MG/DL (ref 70–99)
HBA1C MFR BLD: 5.1 %
HDLC SERPL-MCNC: 34 MG/DL (ref 40–60)
LDLC SERPL CALC-MCNC: 161 MG/DL
POTASSIUM SERPL-SCNC: 4.5 MMOL/L (ref 3.5–5.1)
PROT SERPL-MCNC: 7.5 G/DL (ref 6.4–8.2)
SODIUM SERPL-SCNC: 140 MMOL/L (ref 136–145)
TRIGL SERPL-MCNC: 254 MG/DL (ref 0–150)
TSH SERPL DL<=0.005 MIU/L-ACNC: 1.84 UIU/ML (ref 0.27–4.2)
VLDLC SERPL CALC-MCNC: 51 MG/DL

## 2025-04-19 LAB
BACTERIA URNS QL MICRO: ABNORMAL /HPF
BILIRUB UR QL STRIP.AUTO: NEGATIVE
CLARITY UR: ABNORMAL
COLOR UR: YELLOW
EPI CELLS #/AREA URNS AUTO: 49 /HPF (ref 0–5)
GLUCOSE UR STRIP.AUTO-MCNC: NEGATIVE MG/DL
HERPES SIMPLEX VIRUS 1 IGG: NEGATIVE
HERPES SIMPLEX VIRUS 2 IGG: NEGATIVE
HGB UR QL STRIP.AUTO: NEGATIVE
HIV 1+2 AB+HIV1 P24 AG SERPL QL IA: NORMAL
HIV 2 AB SERPL QL IA: NORMAL
HIV1 AB SERPL QL IA: NORMAL
HIV1 P24 AG SERPL QL IA: NORMAL
HYALINE CASTS #/AREA URNS AUTO: 5 /LPF (ref 0–8)
KETONES UR STRIP.AUTO-MCNC: 15 MG/DL
LEUKOCYTE ESTERASE UR QL STRIP.AUTO: ABNORMAL
NITRITE UR QL STRIP.AUTO: NEGATIVE
PH UR STRIP.AUTO: 6 [PH] (ref 5–8)
PROT UR STRIP.AUTO-MCNC: 30 MG/DL
RBC CLUMPS #/AREA URNS AUTO: 2 /HPF (ref 0–4)
SP GR UR STRIP.AUTO: 1.03 (ref 1–1.03)
SPECIMEN TYPE: NORMAL
TRICHOMONAS VAGINALIS SCREEN: NEGATIVE
UA DIPSTICK W REFLEX MICRO PNL UR: YES
URN SPEC COLLECT METH UR: ABNORMAL
UROBILINOGEN UR STRIP-ACNC: 1 E.U./DL
WBC #/AREA URNS AUTO: 8 /HPF (ref 0–5)

## 2025-04-21 LAB
C TRACH DNA UR QL NAA+PROBE: NEGATIVE
N GONORRHOEA DNA UR QL NAA+PROBE: NEGATIVE

## 2025-04-23 LAB — T PALLIDUM IGG SER QL IF: NON REACTIVE
